# Patient Record
(demographics unavailable — no encounter records)

---

## 2025-06-01 NOTE — HISTORY OF PRESENT ILLNESS
[de-identified] : Odin Fine is a 19 yo gentleman who presents with chronic left knee issues for many years. he denies any trauma or specific injury. He has had more than five years of progressive medial knee pain and stiffness. He does not exercise. He reports pain that interferes with ADLs, he has discomfort on stairs and when walking. he has seen a peds ortho in the past who did not have a definitive diagnosis. He does not have improvement with Advil or resting. He denies any locking or buckling

## 2025-06-01 NOTE — PHYSICAL EXAM
[de-identified] : The patient is a well developed, well nourished male in no apparent distress. He is alert and oriented X 3 with a pleasant mood and appropriate affect  On physical examination of the left knee, his ROM is 0-120 degrees. The patient walks with a normal gait and stands in neutral alignment. There is no effusion. No warmth or erythema is noted. The patella is non tender to palpation medially or laterally. There is no crepitus noted. The apprehension and grind tests are negative. The extensor mechanism is intact. There is medial joint line tenderness. The Cata sign is positive. The Lachman and pivot shift tests are negative. There is no varus or valgus laxity at 0 or 30 degrees. No posterolateral or anteromedial laxity is noted. No masses are palpable. No other soft tissue or bony tenderness is noted. Quadriceps weakness is noted. Neurovascular function is intact. [de-identified] : Radiographs of both knees show well aligned and well maintained joint spacing bilaterally

## 2025-06-01 NOTE — DISCUSSION/SUMMARY
[de-identified] : Mr Parker has had ongoing chronic medial knee pain that has not responded to conservative treatment. He will be sent for an MRI to r.o a chondral defect on the C vs MMT. We will call him with the results. all questions were answered. He will call if any issues arise

## 2025-06-01 NOTE — DISCUSSION/SUMMARY
[de-identified] : Mr Parker has had ongoing chronic medial knee pain that has not responded to conservative treatment. He will be sent for an MRI to r.o a chondral defect on the C vs MMT. We will call him with the results. all questions were answered. He will call if any issues arise

## 2025-06-01 NOTE — PHYSICAL EXAM
[de-identified] : The patient is a well developed, well nourished male in no apparent distress. He is alert and oriented X 3 with a pleasant mood and appropriate affect  On physical examination of the left knee, his ROM is 0-120 degrees. The patient walks with a normal gait and stands in neutral alignment. There is no effusion. No warmth or erythema is noted. The patella is non tender to palpation medially or laterally. There is no crepitus noted. The apprehension and grind tests are negative. The extensor mechanism is intact. There is medial joint line tenderness. The Cata sign is positive. The Lachman and pivot shift tests are negative. There is no varus or valgus laxity at 0 or 30 degrees. No posterolateral or anteromedial laxity is noted. No masses are palpable. No other soft tissue or bony tenderness is noted. Quadriceps weakness is noted. Neurovascular function is intact. [de-identified] : Radiographs of both knees show well aligned and well maintained joint spacing bilaterally

## 2025-06-01 NOTE — HISTORY OF PRESENT ILLNESS
[de-identified] : Odin Fine is a 19 yo gentleman who presents with chronic left knee issues for many years. he denies any trauma or specific injury. He has had more than five years of progressive medial knee pain and stiffness. He does not exercise. He reports pain that interferes with ADLs, he has discomfort on stairs and when walking. he has seen a peds ortho in the past who did not have a definitive diagnosis. He does not have improvement with Advil or resting. He denies any locking or buckling

## 2025-06-01 NOTE — DISCUSSION/SUMMARY
[de-identified] : Mr Parker has had ongoing chronic medial knee pain that has not responded to conservative treatment. He will be sent for an MRI to r.o a chondral defect on the C vs MMT. We will call him with the results. all questions were answered. He will call if any issues arise

## 2025-06-01 NOTE — HISTORY OF PRESENT ILLNESS
[de-identified] : Odin Fine is a 19 yo gentleman who presents with chronic left knee issues for many years. he denies any trauma or specific injury. He has had more than five years of progressive medial knee pain and stiffness. He does not exercise. He reports pain that interferes with ADLs, he has discomfort on stairs and when walking. he has seen a peds ortho in the past who did not have a definitive diagnosis. He does not have improvement with Advil or resting. He denies any locking or buckling

## 2025-06-01 NOTE — PHYSICAL EXAM
[de-identified] : The patient is a well developed, well nourished male in no apparent distress. He is alert and oriented X 3 with a pleasant mood and appropriate affect  On physical examination of the left knee, his ROM is 0-120 degrees. The patient walks with a normal gait and stands in neutral alignment. There is no effusion. No warmth or erythema is noted. The patella is non tender to palpation medially or laterally. There is no crepitus noted. The apprehension and grind tests are negative. The extensor mechanism is intact. There is medial joint line tenderness. The Cata sign is positive. The Lachman and pivot shift tests are negative. There is no varus or valgus laxity at 0 or 30 degrees. No posterolateral or anteromedial laxity is noted. No masses are palpable. No other soft tissue or bony tenderness is noted. Quadriceps weakness is noted. Neurovascular function is intact. [de-identified] : Radiographs of both knees show well aligned and well maintained joint spacing bilaterally

## 2025-06-01 NOTE — END OF VISIT
[[email or web address]] : Dr Coelho has reviewed the chart and agrees that the record accurately reflects his personal performance of the history, physical exam, assessment, and plan. He personally directed, reviewed, and agreed with the chart.All medical record  entries made by CHARLES Souza, acting as a scribe for this encounter under the direction of Clark Coelho MD